# Patient Record
Sex: MALE | Race: WHITE | NOT HISPANIC OR LATINO | Employment: FULL TIME | URBAN - METROPOLITAN AREA
[De-identification: names, ages, dates, MRNs, and addresses within clinical notes are randomized per-mention and may not be internally consistent; named-entity substitution may affect disease eponyms.]

---

## 2023-05-21 ENCOUNTER — HOSPITAL ENCOUNTER (EMERGENCY)
Facility: CLINIC | Age: 23
Discharge: HOME | End: 2023-05-21
Attending: EMERGENCY MEDICINE | Admitting: EMERGENCY MEDICINE
Payer: COMMERCIAL

## 2023-05-21 ENCOUNTER — APPOINTMENT (OUTPATIENT)
Dept: GENERAL RADIOLOGY | Facility: CLINIC | Age: 23
End: 2023-05-21
Attending: EMERGENCY MEDICINE
Payer: COMMERCIAL

## 2023-05-21 VITALS
HEART RATE: 84 BPM | OXYGEN SATURATION: 99 % | SYSTOLIC BLOOD PRESSURE: 128 MMHG | RESPIRATION RATE: 18 BRPM | DIASTOLIC BLOOD PRESSURE: 78 MMHG | TEMPERATURE: 96.4 F

## 2023-05-21 DIAGNOSIS — R07.9 CHEST PAIN, UNSPECIFIED TYPE: ICD-10-CM

## 2023-05-21 DIAGNOSIS — U07.1 INFECTION DUE TO 2019 NOVEL CORONAVIRUS: ICD-10-CM

## 2023-05-21 LAB
ALBUMIN SERPL BCG-MCNC: 4.4 G/DL (ref 3.5–5.2)
ALP SERPL-CCNC: 63 U/L (ref 40–129)
ALT SERPL W P-5'-P-CCNC: 18 U/L (ref 10–50)
ANION GAP SERPL CALCULATED.3IONS-SCNC: 11 MMOL/L (ref 7–15)
AST SERPL W P-5'-P-CCNC: 19 U/L (ref 10–50)
BASOPHILS # BLD AUTO: 0.1 10E3/UL (ref 0–0.2)
BASOPHILS NFR BLD AUTO: 1 %
BILIRUB SERPL-MCNC: 0.2 MG/DL
BUN SERPL-MCNC: 10.2 MG/DL (ref 6–20)
CALCIUM SERPL-MCNC: 8.7 MG/DL (ref 8.6–10)
CHLORIDE SERPL-SCNC: 103 MMOL/L (ref 98–107)
CREAT SERPL-MCNC: 1.02 MG/DL (ref 0.67–1.17)
D DIMER PPP FEU-MCNC: <0.27 UG/ML FEU (ref 0–0.5)
DEPRECATED HCO3 PLAS-SCNC: 25 MMOL/L (ref 22–29)
EOSINOPHIL # BLD AUTO: 0.3 10E3/UL (ref 0–0.7)
EOSINOPHIL NFR BLD AUTO: 5 %
ERYTHROCYTE [DISTWIDTH] IN BLOOD BY AUTOMATED COUNT: 12 % (ref 10–15)
GFR SERPL CREATININE-BSD FRML MDRD: >90 ML/MIN/1.73M2
GLUCOSE SERPL-MCNC: 90 MG/DL (ref 70–99)
HCT VFR BLD AUTO: 48.1 % (ref 40–53)
HGB BLD-MCNC: 16.9 G/DL (ref 13.3–17.7)
IMM GRANULOCYTES # BLD: 0 10E3/UL
IMM GRANULOCYTES NFR BLD: 0 %
LYMPHOCYTES # BLD AUTO: 1.9 10E3/UL (ref 0.8–5.3)
LYMPHOCYTES NFR BLD AUTO: 28 %
MCH RBC QN AUTO: 30.8 PG (ref 26.5–33)
MCHC RBC AUTO-ENTMCNC: 35.1 G/DL (ref 31.5–36.5)
MCV RBC AUTO: 88 FL (ref 78–100)
MONOCYTES # BLD AUTO: 0.9 10E3/UL (ref 0–1.3)
MONOCYTES NFR BLD AUTO: 13 %
NEUTROPHILS # BLD AUTO: 3.7 10E3/UL (ref 1.6–8.3)
NEUTROPHILS NFR BLD AUTO: 53 %
NRBC # BLD AUTO: 0 10E3/UL
NRBC BLD AUTO-RTO: 0 /100
PLATELET # BLD AUTO: 198 10E3/UL (ref 150–450)
POTASSIUM SERPL-SCNC: 4.1 MMOL/L (ref 3.4–5.3)
PROT SERPL-MCNC: 6.9 G/DL (ref 6.4–8.3)
RBC # BLD AUTO: 5.49 10E6/UL (ref 4.4–5.9)
SODIUM SERPL-SCNC: 139 MMOL/L (ref 136–145)
TROPONIN T SERPL HS-MCNC: <6 NG/L
WBC # BLD AUTO: 6.9 10E3/UL (ref 4–11)

## 2023-05-21 PROCEDURE — 85379 FIBRIN DEGRADATION QUANT: CPT | Performed by: EMERGENCY MEDICINE

## 2023-05-21 PROCEDURE — 96361 HYDRATE IV INFUSION ADD-ON: CPT

## 2023-05-21 PROCEDURE — 96360 HYDRATION IV INFUSION INIT: CPT

## 2023-05-21 PROCEDURE — 84484 ASSAY OF TROPONIN QUANT: CPT | Performed by: EMERGENCY MEDICINE

## 2023-05-21 PROCEDURE — 99285 EMERGENCY DEPT VISIT HI MDM: CPT | Mod: 25

## 2023-05-21 PROCEDURE — 93005 ELECTROCARDIOGRAM TRACING: CPT

## 2023-05-21 PROCEDURE — 36415 COLL VENOUS BLD VENIPUNCTURE: CPT | Performed by: EMERGENCY MEDICINE

## 2023-05-21 PROCEDURE — 258N000003 HC RX IP 258 OP 636: Performed by: EMERGENCY MEDICINE

## 2023-05-21 PROCEDURE — 85025 COMPLETE CBC W/AUTO DIFF WBC: CPT | Performed by: EMERGENCY MEDICINE

## 2023-05-21 PROCEDURE — 71046 X-RAY EXAM CHEST 2 VIEWS: CPT

## 2023-05-21 PROCEDURE — 80053 COMPREHEN METABOLIC PANEL: CPT | Performed by: EMERGENCY MEDICINE

## 2023-05-21 RX ORDER — SODIUM CHLORIDE 9 MG/ML
INJECTION, SOLUTION INTRAVENOUS CONTINUOUS
Status: DISCONTINUED | OUTPATIENT
Start: 2023-05-21 | End: 2023-05-21 | Stop reason: HOSPADM

## 2023-05-21 RX ADMIN — SODIUM CHLORIDE 1000 ML: 9 INJECTION, SOLUTION INTRAVENOUS at 19:30

## 2023-05-21 ASSESSMENT — ACTIVITIES OF DAILY LIVING (ADL): ADLS_ACUITY_SCORE: 35

## 2023-05-22 LAB
ATRIAL RATE - MUSE: 83 BPM
DIASTOLIC BLOOD PRESSURE - MUSE: NORMAL MMHG
INTERPRETATION ECG - MUSE: NORMAL
P AXIS - MUSE: 37 DEGREES
PR INTERVAL - MUSE: 150 MS
QRS DURATION - MUSE: 92 MS
QT - MUSE: 352 MS
QTC - MUSE: 413 MS
R AXIS - MUSE: 56 DEGREES
SYSTOLIC BLOOD PRESSURE - MUSE: NORMAL MMHG
T AXIS - MUSE: 32 DEGREES
VENTRICULAR RATE- MUSE: 83 BPM

## 2023-05-22 NOTE — ED TRIAGE NOTES
Pt tested positive for covid 3 days ago. Pt states that he has been having headache and intermittent chest pain. ABC Intact.      Triage Assessment     Row Name 05/21/23 0773       Triage Assessment (Adult)    Airway WDL WDL

## 2023-05-22 NOTE — ED PROVIDER NOTES
History     Chief Complaint:  Chest Pain and Covid Concern       HPI   Jason Khoury is a 22 year old male who presents to the emergency department for evaluation of left chest pain intermittently over the last 3 days.  Began to have a cough 3 days ago and tested positive for COVID-19 that same day.  He has been convalescing at home.  No specific known exposures.  No history of asthma.  No history of DVT PE.  No personal or family history of early heart disease.  He does smoke cigarettes, vapes, and uses marijuana.  No trauma to the chest.  Has not noticed any rash there.  His pain is worse when he gets up to move around.  There is no pleuritic component.  No leg swelling or hemoptysis.  No falls or injuries.  He has intermittently had fevers.  He has some nasal congestion but no sore throat.  No ear pain.      Medications:    No current outpatient medications on file.      Past Medical History:    No past medical history on file.    Past Surgical History:    No past surgical history on file.     Physical Exam     Patient Vitals for the past 24 hrs:   BP Temp Temp src Pulse Resp SpO2   05/21/23 2130 -- -- -- -- -- 98 %   05/21/23 2115 -- -- -- -- -- 98 %   05/21/23 2100 129/83 -- -- 85 -- 99 %   05/21/23 2040 -- -- -- -- -- 99 %   05/21/23 2035 -- -- -- -- -- 98 %   05/21/23 2030 (!) 129/97 -- -- 79 -- 99 %   05/21/23 1956 -- -- -- -- -- 100 %   05/21/23 1941 -- -- -- -- -- 99 %   05/21/23 1927 138/80 -- -- -- -- 98 %   05/21/23 1926 -- -- -- -- -- 98 %   05/21/23 1925 (!) 129/90 -- -- 80 -- 99 %   05/21/23 1920 -- -- -- -- -- 99 %   05/21/23 1905 (!) 132/96 (!) 96.4  F (35.8  C) Temporal 96 18 97 %        Physical Exam  Constitutional:       General: He is not in acute distress.     Appearance: Normal appearance. He is not toxic-appearing.   HENT:      Head: Atraumatic.      Right Ear: Tympanic membrane, ear canal and external ear normal.      Left Ear: Tympanic membrane, ear canal and external ear  normal.      Mouth/Throat:      Mouth: Mucous membranes are moist.      Pharynx: Oropharynx is clear.      Comments: Mild diffuse or erythema of the oropharynx.  No exudate.  No swelling of the tonsils.  Uvula midline.  No peritonsillar or retropharyngeal abscess.  No drooling or stridor.  Eyes:      General: No scleral icterus.     Extraocular Movements: Extraocular movements intact.      Conjunctiva/sclera: Conjunctivae normal.      Pupils: Pupils are equal, round, and reactive to light.   Cardiovascular:      Rate and Rhythm: Normal rate and regular rhythm.      Heart sounds: Normal heart sounds.      Comments: No chest wall tenderness, crepitance, or rash.  Pulmonary:      Effort: Pulmonary effort is normal. No respiratory distress.      Breath sounds: Normal breath sounds.   Abdominal:      Palpations: Abdomen is soft.      Tenderness: There is no abdominal tenderness.   Musculoskeletal:         General: No deformity.      Cervical back: Neck supple.      Right lower leg: No edema.      Left lower leg: No edema.   Skin:     General: Skin is warm.      Capillary Refill: Capillary refill takes less than 2 seconds.   Neurological:      General: No focal deficit present.      Mental Status: He is alert and oriented to person, place, and time.   Psychiatric:         Mood and Affect: Mood normal.         Behavior: Behavior normal.           Emergency Department Course   ECG:  Rate 83.  Normal sinus rhythm.  Intervals are normal.  Axis normal.  No acute ST or T wave changes.  No prior for comparison.    Imaging:  XR Chest 2 Views   Final Result   IMPRESSION: Negative chest.         Report per radiology    Laboratory:  Labs Ordered and Resulted from Time of ED Arrival to Time of ED Departure   COMPREHENSIVE METABOLIC PANEL - Normal       Result Value    Sodium 139      Potassium 4.1      Chloride 103      Carbon Dioxide (CO2) 25      Anion Gap 11      Urea Nitrogen 10.2      Creatinine 1.02      Calcium 8.7       Glucose 90      Alkaline Phosphatase 63      AST 19      ALT 18      Protein Total 6.9      Albumin 4.4      Bilirubin Total 0.2      GFR Estimate >90     TROPONIN T, HIGH SENSITIVITY - Normal    Troponin T, High Sensitivity <6     D DIMER QUANTITATIVE - Normal    D-Dimer Quantitative <0.27     CBC WITH PLATELETS AND DIFFERENTIAL    WBC Count 6.9      RBC Count 5.49      Hemoglobin 16.9      Hematocrit 48.1      MCV 88      MCH 30.8      MCHC 35.1      RDW 12.0      Platelet Count 198      % Neutrophils 53      % Lymphocytes 28      % Monocytes 13      % Eosinophils 5      % Basophils 1      % Immature Granulocytes 0      NRBCs per 100 WBC 0      Absolute Neutrophils 3.7      Absolute Lymphocytes 1.9      Absolute Monocytes 0.9      Absolute Eosinophils 0.3      Absolute Basophils 0.1      Absolute Immature Granulocytes 0.0      Absolute NRBCs 0.0          Emergency Department Course & Assessments:             Interventions:  Medications   0.9% sodium chloride BOLUS (1,000 mLs Intravenous $New Bag 5/21/23 1930)     Followed by   sodium chloride 0.9% infusion (has no administration in time range)        Independent Interpretation (X-rays, CTs, rhythm strip):  Chest x-ray independently reviewed.  No pneumothorax.    Disposition:  The patient was discharged to home.     Impression & Plan      Medical Decision Making:  This patient is a 22-year-old who recently tested positive for COVID-19 at home 3 days ago.  He has left-sided chest discomfort.  No external deformity.  Chest x-ray without infiltrate, effusion, or pneumothorax.  EKG and troponin negative.  D-dimer normal.  Lab work-up overall looks good.  No concern for pericarditis given location and description of his pain.    The patient is appropriate for outpatient follow-up.  We discussed return precautions and supportive care.      Diagnosis:    ICD-10-CM    1. Chest pain, unspecified type  R07.9       2. Infection due to 2019 novel coronavirus  U07.1               5/21/2023   Miah Lopez MD McRoberts, Sean Edward, MD  05/21/23 0000

## 2023-05-22 NOTE — DISCHARGE INSTRUCTIONS
Return to the ER for increased pain, difficulty breathing, or any new concerns.    Follow-up with your primary care physician in 1 week.

## 2023-06-18 ENCOUNTER — HOSPITAL ENCOUNTER (EMERGENCY)
Facility: CLINIC | Age: 23
Discharge: HOME | End: 2023-06-19
Attending: EMERGENCY MEDICINE | Admitting: EMERGENCY MEDICINE
Payer: COMMERCIAL

## 2023-06-18 DIAGNOSIS — R21 RASH: ICD-10-CM

## 2023-06-18 PROCEDURE — 99283 EMERGENCY DEPT VISIT LOW MDM: CPT

## 2023-06-18 RX ORDER — DIPHENHYDRAMINE HCL 50 MG
50 CAPSULE ORAL EVERY 6 HOURS PRN
Qty: 20 CAPSULE | Refills: 0 | Status: SHIPPED | OUTPATIENT
Start: 2023-06-18

## 2023-06-18 RX ORDER — PREDNISONE 20 MG/1
TABLET ORAL
Qty: 8 TABLET | Refills: 0 | Status: SHIPPED | OUTPATIENT
Start: 2023-06-18

## 2023-06-18 RX ORDER — PREDNISONE 20 MG/1
40 TABLET ORAL ONCE
Status: COMPLETED | OUTPATIENT
Start: 2023-06-18 | End: 2023-06-19

## 2023-06-19 VITALS
HEART RATE: 92 BPM | TEMPERATURE: 96.9 F | DIASTOLIC BLOOD PRESSURE: 77 MMHG | RESPIRATION RATE: 20 BRPM | SYSTOLIC BLOOD PRESSURE: 133 MMHG | OXYGEN SATURATION: 98 %

## 2023-06-19 PROCEDURE — 250N000012 HC RX MED GY IP 250 OP 636 PS 637: Performed by: EMERGENCY MEDICINE

## 2023-06-19 RX ADMIN — PREDNISONE 40 MG: 20 TABLET ORAL at 00:00

## 2023-06-19 NOTE — ED TRIAGE NOTES
Arrives from work. States the he has a rash on his arms and legs, states it is itchy and painful.     States has not attempted any OTC medications.

## 2023-06-19 NOTE — ED PROVIDER NOTES
History     Chief Complaint:  Rash       HPI   Jason Rooney is a 22 year old male who presents with rash.  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  At that point to his legs and outs of pain to his upper arms.  Interestingly this is only on the exposed skin but nothing that under his shorts or his T-shirt.  There is no airway involvement.  No swelling.  This is pruritic but no open areas warmth or tenderness.  He is otherwise healthy.  No known allergies.  The only thing he did different was he had a different flavor drink at breakfast this morning.      Independent Historian:    The patient      Medications:    diphenhydrAMINE (BENADRYL) 50 MG capsule  predniSONE (DELTASONE) 20 MG tablet        Past Medical History:    No past medical history on file.    Past Surgical History:    No past surgical history on file.       Physical Exam   Patient Vitals for the past 24 hrs:   BP Temp Temp src Pulse Resp SpO2   06/18/23 2328 (!) 140/84 96.9  F (36.1  C) Temporal 85 18 96 %        Physical Exam  Constitutional: Vital signs reviewed.  Pleasant.  HEENT: Moist mucous membranes.  No angioedema or stridor.  Posterior oropharynx is clear.  He is tolerating his own secretions.  Cardiovascular: Regular rate and rhythm  Pulmonary/Chest: Breathing comfortably on room air.  No audible wheezing  Musculoskeletal/Extremities: No bony deformities.  Moves all 4 extremities without difficulty.  Neurological: Alert.  No focal deficits.  Endo: No pitting edema  Skin: Fine macular rash throughout all 4 extremities.  No open areas, drainage, or discharge.  Psychiatric: Pleasant.        Emergency Department Course     Emergency Department Course & Assessments:    Interventions:  Medications   predniSONE (DELTASONE) tablet 40 mg (has no administration in time range)        Social Determinants of Health affecting care:  None     Disposition:  The patient was discharged to home.      Impression & Plan        Medical Decision Making:  Patient presents with a pruritic macular rash which started on his right wrist and now involves all 4 extremities.  Again this is only on the distal extremities are not covered by his shorts or his T-shirt.  Nothing on the trunk.  There is no angioedema stridor or airway involvement.  He is driving tonight so I will give a dose of prednisone here.  I will discharge him home with a prednisone burst and a prescription for Benadryl he can take when he is not driving.  Patient is comfortable with the plan will be discharged home.    Diagnosis:    ICD-10-CM    1. Rash  R21            Discharge Medications:  New Prescriptions    DIPHENHYDRAMINE (BENADRYL) 50 MG CAPSULE    Take 1 capsule (50 mg) by mouth every 6 hours as needed for itching or allergies    PREDNISONE (DELTASONE) 20 MG TABLET    Take two tablets (= 40mg) each day for 5 (five) days          Keyon Grimm MD  6/18/2023   Keyon Grimm MD Walters, Brent Aaron, MD  06/19/23 0001

## 2023-08-09 ENCOUNTER — APPOINTMENT (OUTPATIENT)
Dept: GENERAL RADIOLOGY | Facility: CLINIC | Age: 23
End: 2023-08-09
Attending: SOCIAL WORKER
Payer: COMMERCIAL

## 2023-08-09 ENCOUNTER — APPOINTMENT (OUTPATIENT)
Dept: GENERAL RADIOLOGY | Facility: CLINIC | Age: 23
End: 2023-08-09
Attending: PHYSICIAN ASSISTANT
Payer: COMMERCIAL

## 2023-08-09 ENCOUNTER — HOSPITAL ENCOUNTER (EMERGENCY)
Facility: CLINIC | Age: 23
Discharge: HOME | End: 2023-08-09
Attending: PHYSICIAN ASSISTANT | Admitting: PHYSICIAN ASSISTANT
Payer: COMMERCIAL

## 2023-08-09 VITALS
TEMPERATURE: 98.3 F | OXYGEN SATURATION: 98 % | SYSTOLIC BLOOD PRESSURE: 121 MMHG | DIASTOLIC BLOOD PRESSURE: 92 MMHG | RESPIRATION RATE: 18 BRPM | HEART RATE: 98 BPM

## 2023-08-09 DIAGNOSIS — S80.12XA CONTUSION OF LEFT LOWER LEG, INITIAL ENCOUNTER: ICD-10-CM

## 2023-08-09 DIAGNOSIS — S92.425A CLOSED NONDISPLACED FRACTURE OF DISTAL PHALANX OF LEFT GREAT TOE, INITIAL ENCOUNTER: ICD-10-CM

## 2023-08-09 PROCEDURE — 28490 TREAT BIG TOE FRACTURE: CPT | Mod: LT

## 2023-08-09 PROCEDURE — 73660 X-RAY EXAM OF TOE(S): CPT | Mod: LT

## 2023-08-09 PROCEDURE — 73590 X-RAY EXAM OF LOWER LEG: CPT | Mod: LT

## 2023-08-09 PROCEDURE — 73610 X-RAY EXAM OF ANKLE: CPT | Mod: LT

## 2023-08-09 PROCEDURE — 99284 EMERGENCY DEPT VISIT MOD MDM: CPT | Mod: 25

## 2023-08-09 ASSESSMENT — ACTIVITIES OF DAILY LIVING (ADL): ADLS_ACUITY_SCORE: 35

## 2023-08-09 NOTE — ED TRIAGE NOTES
Pt states he was slipped on wet concrete and hurt his first digit of L foot and L ankle approx an hour PTA. CMS intact. Ibup PTA. ABC intact.

## 2023-08-10 NOTE — DISCHARGE INSTRUCTIONS
Wear flat soled shoe when up and about.  Rest, ice, elevate as able.  Use Tylenol and ibuprofen for pain.

## 2023-08-10 NOTE — ED PROVIDER NOTES
History     Chief Complaint:  Foot Pain       The history is provided by the patient.      Jason Rooney is a 23 year old male who presents with left lower extremity pain. The patient reports that today around 1600 he was playing with his nephew on wet concrete when he slid and hit his left shin onto a protruding structure. His foot slid underneath this and he then fell over the structure onto this arms. He did not hit his head and he denies experiencing headache, neck pain, or upper extremity pain. He complains of difficulty moving his left great toe.     Independent Historian:   None - Patient Only    Review of External Notes:   None    Medications:    The patient is not currently taking any prescribed medications.    Past Medical History:    The patient does not have any past pertinent medical history.    Physical Exam   Patient Vitals for the past 24 hrs:   BP Temp Temp src Pulse Resp SpO2   08/09/23 1757 (!) 121/92 -- -- -- -- --   08/09/23 1755 -- 98.3  F (36.8  C) Temporal 98 18 98 %        Physical Exam  HENT: mmm  Eyes: PERRL B/L  Neck: Supple  CV: Peripheral pulses in tact and regular  Resp: Speaking in full sentences without any respiratory distress  Ext:  Left lower extremity  TTP to mid lateral fibula.  TTP to distal phalanx of great toe.  No other bony TTP.  Decreased ROM of great toe, otherwise full ROM.  Sensation intact distally.  <2 sec cap refill to all digits.  Remainder of the skeletal survey is unremarkable  Skin: warm dry well perfused  Neuro: Alert, no gross motor or sensory deficits  Psych: Calm  Nursing notes and vital signs reviewed.      Emergency Department Course     Imaging:  XR Tibia and Fibula Left 2 Views   Final Result   IMPRESSION: Negative tibia and fibula. No fracture.      XR Ankle Left G/E 3 Views   Final Result   IMPRESSION: Normal joint spaces and alignment. No fracture.      XR Toe Left G/E 2 Views   Final Result   IMPRESSION: Normal joint spaces and  alignment. There is likely an acute nondisplaced fracture of the medial base of the distal phalanx great toe.         Report per radiology    Emergency Department Course & Assessments:    Interventions:  None    Assessments:  1941 I obtained history and examined the patient.  2108 I reassessed the patient and explained findings. He is comfortable with the plan for discharge to home.    Independent Interpretation (X-rays, CTs, rhythm strip):  I personally evaluated the patient's toe XR, obvious fracture to distal phalanx.    Consultations/Discussion of Management or Tests:  None     Social Determinants of Health affecting care:   None    Disposition:  The patient was discharged to home.     Impression & Plan      Medical Decision Making:  Jason Rooney is a 23 year old male who presents to the ED for evaluation of left leg pain.  See HPI as above for additional details.  Vitals and physical exam as above.  Differentials broad included fracture, sprain, strain, dislocation, contusion, amongst others.  X-rays obtained as above reveal evidence for great toe fracture.  Suspect contusion of of mid fibula.  Discussed conservative cares, including Tylenol, ibuprofen.  Postop shoe provided.  Advise follow-up with orthopedics regarding fracture.  [place] patient was safe for discharge to home. Discussed reasons to return. All questions answered. Patient discharged to home in stable condition.    Diagnosis:    ICD-10-CM    1. Closed nondisplaced fracture of distal phalanx of left great toe, initial encounter  S92.425A       2. Contusion of left lower leg, initial encounter  S80.12XA            Discharge Medications:  New Prescriptions    No medications on file      Scribe Disclosure:  I, Blanquita Salamanca, am serving as a scribe at 7:44 PM on 8/9/2023 to document services personally performed by Cresencio Dawn PA-C based on my observations and the provider's statements to me.     This record was created at least in  part using electronic voice recognition software, so please excuse any typographical errors.       Cresencio Dawn PA-C  08/10/23 0032